# Patient Record
Sex: MALE | Race: WHITE
[De-identification: names, ages, dates, MRNs, and addresses within clinical notes are randomized per-mention and may not be internally consistent; named-entity substitution may affect disease eponyms.]

---

## 2023-05-21 ENCOUNTER — HOSPITAL ENCOUNTER (EMERGENCY)
Dept: HOSPITAL 26 - MED | Age: 28
Discharge: HOME | End: 2023-05-21
Payer: SELF-PAY

## 2023-05-21 VITALS — WEIGHT: 145 LBS | HEIGHT: 63 IN | BODY MASS INDEX: 25.69 KG/M2

## 2023-05-21 VITALS — DIASTOLIC BLOOD PRESSURE: 80 MMHG | SYSTOLIC BLOOD PRESSURE: 129 MMHG

## 2023-05-21 DIAGNOSIS — R44.1: ICD-10-CM

## 2023-05-21 DIAGNOSIS — F12.929: Primary | ICD-10-CM

## 2023-05-21 DIAGNOSIS — Z79.899: ICD-10-CM

## 2023-05-21 NOTE — NUR
PT CLEARED FOR DISCHARGE BY DR. MUELLER. ALL D/C INSTRUCTIONS PROVIDED. PT 
RELEASED INTO CUSTODY OF SISTER. PT AMBULATED TO PERSONAL VEHICLE